# Patient Record
Sex: FEMALE | Race: WHITE | Employment: OTHER | URBAN - METROPOLITAN AREA
[De-identification: names, ages, dates, MRNs, and addresses within clinical notes are randomized per-mention and may not be internally consistent; named-entity substitution may affect disease eponyms.]

---

## 2020-11-17 ENCOUNTER — HOSPITAL ENCOUNTER (OUTPATIENT)
Age: 30
Discharge: HOME | End: 2020-11-17
Payer: COMMERCIAL

## 2020-11-17 VITALS
TEMPERATURE: 98 F | WEIGHT: 210 LBS | RESPIRATION RATE: 17 BRPM | DIASTOLIC BLOOD PRESSURE: 79 MMHG | HEIGHT: 67 IN | OXYGEN SATURATION: 100 % | SYSTOLIC BLOOD PRESSURE: 128 MMHG | BODY MASS INDEX: 32.96 KG/M2 | HEART RATE: 88 BPM

## 2020-11-17 DIAGNOSIS — J02.0 STREPTOCOCCAL SORE THROAT: ICD-10-CM

## 2020-11-17 DIAGNOSIS — Z20.822 ENCOUNTER FOR LABORATORY TESTING FOR COVID-19 VIRUS: Primary | ICD-10-CM

## 2020-11-17 PROBLEM — K21.9 GASTROESOPHAGEAL REFLUX DISEASE: Status: ACTIVE | Noted: 2019-06-20

## 2020-11-17 PROCEDURE — 87880 STREP A ASSAY W/OPTIC: CPT | Performed by: EMERGENCY MEDICINE

## 2020-11-17 PROCEDURE — 81025 URINE PREGNANCY TEST: CPT | Performed by: EMERGENCY MEDICINE

## 2020-11-17 PROCEDURE — 99202 OFFICE O/P NEW SF 15 MIN: CPT | Performed by: EMERGENCY MEDICINE

## 2020-11-17 RX ORDER — PENICILLIN V POTASSIUM 500 MG/1
500 TABLET ORAL 2 TIMES DAILY
Qty: 20 TABLET | Refills: 0 | Status: SHIPPED | OUTPATIENT
Start: 2020-11-17 | End: 2020-11-27

## 2020-11-17 NOTE — ED PROVIDER NOTES
Patient Seen in: [facility]      History   Patient presents with:  Sore Throat    Stated Complaint: SORE THROAT    Ana Mccann is a 35year old  female here for sore throat that started Sunday and then had white spots that came Monday.  Poss HENT:      Head: Normocephalic. Right Ear: External ear normal.      Left Ear: External ear normal.      Nose: No congestion. Mouth/Throat:      Mouth: Mucous membranes are moist.      Tonsils: Tonsillar exudate present. 2+ on the right.  2+ on conditions may arise and to go to the ER for new, worsening or any persistent conditions. I've explained the importance of following up with their doctor as instructed. The patient verbalized understanding of the discharge instructions and plan.

## 2020-11-17 NOTE — ED INITIAL ASSESSMENT (HPI)
Per pt having sore throat that started Sunday and noticed white spots on Monday at this time pain has resolved, denies any fevers.